# Patient Record
Sex: MALE | Race: BLACK OR AFRICAN AMERICAN | NOT HISPANIC OR LATINO | URBAN - NONMETROPOLITAN AREA
[De-identification: names, ages, dates, MRNs, and addresses within clinical notes are randomized per-mention and may not be internally consistent; named-entity substitution may affect disease eponyms.]

---

## 2021-06-22 ENCOUNTER — IMPORTED ENCOUNTER (OUTPATIENT)
Dept: URBAN - NONMETROPOLITAN AREA CLINIC 1 | Facility: CLINIC | Age: 71
End: 2021-06-22

## 2021-06-22 PROBLEM — H52.13: Noted: 2021-06-22

## 2021-06-22 PROBLEM — H52.4: Noted: 2021-06-22

## 2021-06-22 PROBLEM — H25.12: Noted: 2021-06-22

## 2021-06-22 PROBLEM — Z96.1: Noted: 2021-06-22

## 2021-06-22 PROCEDURE — 92004 COMPRE OPH EXAM NEW PT 1/>: CPT

## 2021-06-22 NOTE — PATIENT DISCUSSION
s/p PCIOL-Stable PCIOL OD.-Monitor for PCO. Cataract(s) OS-Visually significant.-Cataract(s) causing symptomatic impairment of visual function not correctable with a tolerable change in glasses or contact lenses lighting or non-operative means resulting in specific activity limitations and/or participation restrictions including but not limited to reading viewing television driving or meeting vocational or recreational needs. -Expectation is clearer vision and reduced glare disability after cataract removal.-Refer to Dr Fercho Alvarez for cataract evaluationLID LESION OSEDUCATE PTREFER TO DR. BELTRAN FOR EXEC CONSULT

## 2021-07-28 ENCOUNTER — IMPORTED ENCOUNTER (OUTPATIENT)
Dept: URBAN - NONMETROPOLITAN AREA CLINIC 1 | Facility: CLINIC | Age: 71
End: 2021-07-28

## 2021-07-28 PROBLEM — H25.812: Noted: 2021-07-28

## 2021-07-28 PROBLEM — Z96.1: Noted: 2021-07-28

## 2021-07-28 PROCEDURE — 92014 COMPRE OPH EXAM EST PT 1/>: CPT

## 2021-07-28 NOTE — PATIENT DISCUSSION
Cataract(s)-Visually significant cataract OS . -Cataract(s) causing symptomatic impairment of visual function not correctable with a tolerable change in glasses or contact lenses lighting or non-operative means resulting in specific activity limitations and/or participation restrictions including but not limited to reading viewing television driving or meeting vocational or recreational needs. -Expectation is clearer vision and functional improvement in symptoms as well as reduced glare disability after cataract removal.-Order IOLMaster and OPD today. -Recommend Stand/Trad  based on today's OPD testing and lifestyle questionnaire.-All questions were answered regarding surgery including pre and post-op medications appointments activity restrictions and anesthetic usage.-The risks benefits and alternatives and special risk factors for the patient were discussed in detail including but not limited to: bleeding infection retinal detachment vitreous loss problems with the implant and possible need for additional surgery.-Although rare the possibility of complete vision loss was discussed.-The possible need for glasses post-operatively was discussed.-Order medical clearance exam based on history of arthritis -Patient elects to proceed with cataract surgery OSPt elects Stand/Trad -[id number] s/p PCIOL-Stable PCIOL OD.-Monitor for PCO.""

## 2021-09-13 PROBLEM — Z96.1: Noted: 2021-09-13

## 2021-09-13 PROBLEM — H25.812: Noted: 2021-09-13

## 2021-09-15 ENCOUNTER — IMPORTED ENCOUNTER (OUTPATIENT)
Dept: URBAN - NONMETROPOLITAN AREA CLINIC 1 | Facility: CLINIC | Age: 71
End: 2021-09-15

## 2021-09-16 PROBLEM — Z01.818: Noted: 2021-09-16

## 2021-09-16 PROBLEM — M13.80: Noted: 2021-09-16

## 2021-11-04 ENCOUNTER — IMPORTED ENCOUNTER (OUTPATIENT)
Dept: URBAN - NONMETROPOLITAN AREA CLINIC 1 | Facility: CLINIC | Age: 71
End: 2021-11-04

## 2021-11-04 PROBLEM — M13.80: Noted: 2021-11-04

## 2021-11-04 PROBLEM — Z01.818: Noted: 2021-11-04

## 2021-11-12 ENCOUNTER — IMPORTED ENCOUNTER (OUTPATIENT)
Dept: URBAN - NONMETROPOLITAN AREA CLINIC 1 | Facility: CLINIC | Age: 71
End: 2021-11-12

## 2021-11-12 PROBLEM — Z98.42: Noted: 2021-11-12

## 2021-11-12 PROCEDURE — 99024 POSTOP FOLLOW-UP VISIT: CPT

## 2021-11-12 NOTE — PATIENT DISCUSSION
s/p PCIOL Stand/Trad IOL OS 11/11/21 JS-Pt doing well s/p PCIOL. -Continue post-op gtts according to instruction sheet and sleep with eye shield over eye for 7 nights.-Avoid bending at the waist lifting anything over 5lbs and dirty or jamal environments. -RTC .

## 2021-11-18 ENCOUNTER — IMPORTED ENCOUNTER (OUTPATIENT)
Dept: URBAN - NONMETROPOLITAN AREA CLINIC 1 | Facility: CLINIC | Age: 71
End: 2021-11-18

## 2021-11-18 ENCOUNTER — PREPPED CHART (OUTPATIENT)
Dept: RURAL CLINIC 1 | Facility: CLINIC | Age: 71
End: 2021-11-18

## 2021-11-18 PROCEDURE — 99024 POSTOP FOLLOW-UP VISIT: CPT

## 2022-03-22 ASSESSMENT — TONOMETRY
OD_IOP_MMHG: 14
OS_IOP_MMHG: 14

## 2022-03-24 ENCOUNTER — ESTABLISHED PATIENT (OUTPATIENT)
Dept: RURAL CLINIC 1 | Facility: CLINIC | Age: 72
End: 2022-03-24

## 2022-03-24 DIAGNOSIS — Z96.1: ICD-10-CM

## 2022-03-24 PROCEDURE — 92014 COMPRE OPH EXAM EST PT 1/>: CPT

## 2022-03-24 ASSESSMENT — VISUAL ACUITY
OS_SC: 20/25-1
OD_SC: 20/25

## 2022-03-24 ASSESSMENT — TONOMETRY
OD_IOP_MMHG: 13
OS_IOP_MMHG: 13

## 2022-03-28 ENCOUNTER — ESTABLISHED PATIENT (OUTPATIENT)
Dept: RURAL CLINIC 1 | Facility: CLINIC | Age: 72
End: 2022-03-28

## 2022-03-28 DIAGNOSIS — Z96.1: ICD-10-CM

## 2022-03-28 DIAGNOSIS — H02.825: ICD-10-CM

## 2022-03-28 PROCEDURE — 92012 INTRM OPH EXAM EST PATIENT: CPT

## 2022-03-28 ASSESSMENT — TONOMETRY
OD_IOP_MMHG: 16
OS_IOP_MMHG: 16

## 2022-03-28 ASSESSMENT — VISUAL ACUITY
OS_PH: 20/30
OD_SC: 20/50
OD_PH: 20/30
OS_SC: 20/50-1

## 2022-04-04 ENCOUNTER — CLINIC PROCEDURE ONLY (OUTPATIENT)
Dept: RURAL CLINIC 1 | Facility: CLINIC | Age: 72
End: 2022-04-04

## 2022-04-04 DIAGNOSIS — H02.822: ICD-10-CM

## 2022-04-04 DIAGNOSIS — H02.825: ICD-10-CM

## 2022-04-04 PROCEDURE — 11440 EXC FACE-MM B9+MARG 0.5 CM/<: CPT

## 2022-04-04 NOTE — PROCEDURE NOTE: CLINICAL
PROCEDURE NOTE: Excision of Sebaceous Cyst Bilateral Lower Lids. Diagnosis: Sebaceous Cyst of Eyelid.

## 2022-04-15 ASSESSMENT — TONOMETRY
OS_IOP_MMHG: 23
OD_IOP_MMHG: 14
OS_IOP_MMHG: 10
OS_IOP_MMHG: 14
OD_IOP_MMHG: 10
OS_IOP_MMHG: 15
OD_IOP_MMHG: 15

## 2022-04-15 ASSESSMENT — VISUAL ACUITY
OD_SC: J3
OD_CC: 20/40
OD_PAM: 20/40
OD_CC: 20/40-2
OD_PH: 20/30+2
OD_GLARE: 20/40-2
OS_CC: 20/400
OS_CC: 20/30+2
OS_AM: 20/200
OS_GLARE: CF 2'
OS_CC: 20/200
OS_SC: 20/70-1
OS_CC: 20/30-2
OS_SC: 20/400